# Patient Record
Sex: FEMALE | Race: WHITE | Employment: OTHER
[De-identification: names, ages, dates, MRNs, and addresses within clinical notes are randomized per-mention and may not be internally consistent; named-entity substitution may affect disease eponyms.]

---

## 2020-11-16 ENCOUNTER — HEALTH MAINTENANCE LETTER (OUTPATIENT)
Age: 40
End: 2020-11-16

## 2020-12-09 ENCOUNTER — APPOINTMENT (OUTPATIENT)
Dept: GENERAL RADIOLOGY | Facility: CLINIC | Age: 40
End: 2020-12-09
Attending: EMERGENCY MEDICINE
Payer: COMMERCIAL

## 2020-12-09 ENCOUNTER — HOSPITAL ENCOUNTER (EMERGENCY)
Facility: CLINIC | Age: 40
Discharge: HOME | End: 2020-12-09
Attending: EMERGENCY MEDICINE | Admitting: EMERGENCY MEDICINE
Payer: COMMERCIAL

## 2020-12-09 VITALS
OXYGEN SATURATION: 100 % | WEIGHT: 134 LBS | BODY MASS INDEX: 21.03 KG/M2 | TEMPERATURE: 99.6 F | HEIGHT: 67 IN | DIASTOLIC BLOOD PRESSURE: 55 MMHG | RESPIRATION RATE: 15 BRPM | HEART RATE: 88 BPM | SYSTOLIC BLOOD PRESSURE: 92 MMHG

## 2020-12-09 DIAGNOSIS — Z92.21 STATUS POST CHEMOTHERAPY: ICD-10-CM

## 2020-12-09 DIAGNOSIS — Z20.822 SUSPECTED COVID-19 VIRUS INFECTION: ICD-10-CM

## 2020-12-09 DIAGNOSIS — R50.9 FEVER OF UNKNOWN ORIGIN: ICD-10-CM

## 2020-12-09 LAB
ALBUMIN SERPL-MCNC: 3.6 G/DL (ref 3.4–5)
ALBUMIN UR-MCNC: NEGATIVE MG/DL
ALP SERPL-CCNC: 68 U/L (ref 40–150)
ALT SERPL W P-5'-P-CCNC: 59 U/L (ref 0–50)
ANION GAP SERPL CALCULATED.3IONS-SCNC: 4 MMOL/L (ref 3–14)
APPEARANCE UR: CLEAR
AST SERPL W P-5'-P-CCNC: 20 U/L (ref 0–45)
BASOPHILS # BLD AUTO: 0 10E9/L (ref 0–0.2)
BASOPHILS NFR BLD AUTO: 0.5 %
BILIRUB SERPL-MCNC: 0.4 MG/DL (ref 0.2–1.3)
BILIRUB UR QL STRIP: NEGATIVE
BUN SERPL-MCNC: 10 MG/DL (ref 7–30)
CALCIUM SERPL-MCNC: 8.4 MG/DL (ref 8.5–10.1)
CHLORIDE SERPL-SCNC: 104 MMOL/L (ref 94–109)
CO2 SERPL-SCNC: 28 MMOL/L (ref 20–32)
COLOR UR AUTO: ABNORMAL
CREAT SERPL-MCNC: 0.65 MG/DL (ref 0.52–1.04)
CRP SERPL-MCNC: 3.1 MG/L (ref 0–8)
DIFFERENTIAL METHOD BLD: ABNORMAL
EOSINOPHIL # BLD AUTO: 0 10E9/L (ref 0–0.7)
EOSINOPHIL NFR BLD AUTO: 0.5 %
ERYTHROCYTE [DISTWIDTH] IN BLOOD BY AUTOMATED COUNT: 15.2 % (ref 10–15)
GFR SERPL CREATININE-BSD FRML MDRD: >90 ML/MIN/{1.73_M2}
GLUCOSE SERPL-MCNC: 88 MG/DL (ref 70–99)
GLUCOSE UR STRIP-MCNC: NEGATIVE MG/DL
HCT VFR BLD AUTO: 31.2 % (ref 35–47)
HGB BLD-MCNC: 10.4 G/DL (ref 11.7–15.7)
HGB UR QL STRIP: NEGATIVE
IMM GRANULOCYTES # BLD: 0 10E9/L (ref 0–0.4)
IMM GRANULOCYTES NFR BLD: 0.9 %
KETONES UR STRIP-MCNC: NEGATIVE MG/DL
LACTATE BLD-SCNC: 0.8 MMOL/L (ref 0.7–2)
LEUKOCYTE ESTERASE UR QL STRIP: NEGATIVE
LYMPHOCYTES # BLD AUTO: 0.3 10E9/L (ref 0.8–5.3)
LYMPHOCYTES NFR BLD AUTO: 6.1 %
MCH RBC QN AUTO: 30.4 PG (ref 26.5–33)
MCHC RBC AUTO-ENTMCNC: 33.3 G/DL (ref 31.5–36.5)
MCV RBC AUTO: 91 FL (ref 78–100)
MONOCYTES # BLD AUTO: 0.4 10E9/L (ref 0–1.3)
MONOCYTES NFR BLD AUTO: 9.9 %
MUCOUS THREADS #/AREA URNS LPF: PRESENT /LPF
NEUTROPHILS # BLD AUTO: 3.6 10E9/L (ref 1.6–8.3)
NEUTROPHILS NFR BLD AUTO: 82.1 %
NITRATE UR QL: NEGATIVE
NRBC # BLD AUTO: 0 10*3/UL
NRBC BLD AUTO-RTO: 0 /100
PH UR STRIP: 7.5 PH (ref 5–7)
PLATELET # BLD AUTO: 169 10E9/L (ref 150–450)
POTASSIUM SERPL-SCNC: 3.7 MMOL/L (ref 3.4–5.3)
PROT SERPL-MCNC: 6.7 G/DL (ref 6.8–8.8)
RBC # BLD AUTO: 3.42 10E12/L (ref 3.8–5.2)
RBC #/AREA URNS AUTO: 1 /HPF (ref 0–2)
SARS-COV-2 RNA SPEC QL NAA+PROBE: NORMAL
SODIUM SERPL-SCNC: 136 MMOL/L (ref 133–144)
SOURCE: ABNORMAL
SP GR UR STRIP: 1.01 (ref 1–1.03)
SPECIMEN SOURCE: NORMAL
UROBILINOGEN UR STRIP-MCNC: NORMAL MG/DL (ref 0–2)
WBC # BLD AUTO: 4.4 10E9/L (ref 4–11)
WBC #/AREA URNS AUTO: 1 /HPF (ref 0–5)

## 2020-12-09 PROCEDURE — 87040 BLOOD CULTURE FOR BACTERIA: CPT | Performed by: EMERGENCY MEDICINE

## 2020-12-09 PROCEDURE — 86140 C-REACTIVE PROTEIN: CPT | Performed by: EMERGENCY MEDICINE

## 2020-12-09 PROCEDURE — 81001 URINALYSIS AUTO W/SCOPE: CPT | Performed by: EMERGENCY MEDICINE

## 2020-12-09 PROCEDURE — 85025 COMPLETE CBC W/AUTO DIFF WBC: CPT | Performed by: EMERGENCY MEDICINE

## 2020-12-09 PROCEDURE — 87086 URINE CULTURE/COLONY COUNT: CPT | Performed by: EMERGENCY MEDICINE

## 2020-12-09 PROCEDURE — C9803 HOPD COVID-19 SPEC COLLECT: HCPCS

## 2020-12-09 PROCEDURE — U0003 INFECTIOUS AGENT DETECTION BY NUCLEIC ACID (DNA OR RNA); SEVERE ACUTE RESPIRATORY SYNDROME CORONAVIRUS 2 (SARS-COV-2) (CORONAVIRUS DISEASE [COVID-19]), AMPLIFIED PROBE TECHNIQUE, MAKING USE OF HIGH THROUGHPUT TECHNOLOGIES AS DESCRIBED BY CMS-2020-01-R: HCPCS | Performed by: EMERGENCY MEDICINE

## 2020-12-09 PROCEDURE — 250N000013 HC RX MED GY IP 250 OP 250 PS 637: Performed by: EMERGENCY MEDICINE

## 2020-12-09 PROCEDURE — 258N000003 HC RX IP 258 OP 636: Performed by: EMERGENCY MEDICINE

## 2020-12-09 PROCEDURE — 250N000011 HC RX IP 250 OP 636: Performed by: EMERGENCY MEDICINE

## 2020-12-09 PROCEDURE — 83605 ASSAY OF LACTIC ACID: CPT | Performed by: EMERGENCY MEDICINE

## 2020-12-09 PROCEDURE — 99284 EMERGENCY DEPT VISIT MOD MDM: CPT | Mod: 25

## 2020-12-09 PROCEDURE — 71045 X-RAY EXAM CHEST 1 VIEW: CPT

## 2020-12-09 PROCEDURE — 96365 THER/PROPH/DIAG IV INF INIT: CPT

## 2020-12-09 PROCEDURE — 96361 HYDRATE IV INFUSION ADD-ON: CPT

## 2020-12-09 PROCEDURE — 80053 COMPREHEN METABOLIC PANEL: CPT | Performed by: EMERGENCY MEDICINE

## 2020-12-09 RX ORDER — LEVOFLOXACIN 5 MG/ML
500 INJECTION, SOLUTION INTRAVENOUS ONCE
Status: COMPLETED | OUTPATIENT
Start: 2020-12-09 | End: 2020-12-09

## 2020-12-09 RX ORDER — HEPARIN SODIUM (PORCINE) LOCK FLUSH IV SOLN 100 UNIT/ML 100 UNIT/ML
5 SOLUTION INTRAVENOUS ONCE
Status: COMPLETED | OUTPATIENT
Start: 2020-12-09 | End: 2020-12-09

## 2020-12-09 RX ORDER — ACETAMINOPHEN 325 MG/1
650 TABLET ORAL ONCE
Status: COMPLETED | OUTPATIENT
Start: 2020-12-09 | End: 2020-12-09

## 2020-12-09 RX ORDER — LEVOFLOXACIN 500 MG/1
500 TABLET, FILM COATED ORAL DAILY
Qty: 6 TABLET | Refills: 0 | Status: SHIPPED | OUTPATIENT
Start: 2020-12-09 | End: 2020-12-15

## 2020-12-09 RX ADMIN — SODIUM CHLORIDE 1000 ML: 9 INJECTION, SOLUTION INTRAVENOUS at 11:47

## 2020-12-09 RX ADMIN — ACETAMINOPHEN 650 MG: 325 TABLET, FILM COATED ORAL at 11:11

## 2020-12-09 RX ADMIN — LEVOFLOXACIN 500 MG: 5 INJECTION, SOLUTION INTRAVENOUS at 13:57

## 2020-12-09 RX ADMIN — SODIUM CHLORIDE 1000 ML: 9 INJECTION, SOLUTION INTRAVENOUS at 12:57

## 2020-12-09 RX ADMIN — HEPARIN SODIUM (PORCINE) LOCK FLUSH IV SOLN 100 UNIT/ML 5 ML: 100 SOLUTION at 16:07

## 2020-12-09 ASSESSMENT — ENCOUNTER SYMPTOMS
FREQUENCY: 0
DIARRHEA: 0
NAUSEA: 0
FEVER: 1
SHORTNESS OF BREATH: 0
SORE THROAT: 0
MYALGIAS: 1
CHILLS: 1
BACK PAIN: 0
RHINORRHEA: 0
DYSURIA: 0
VOMITING: 0
COUGH: 0
HEMATURIA: 0
FATIGUE: 1
HEADACHES: 1

## 2020-12-09 ASSESSMENT — MIFFLIN-ST. JEOR: SCORE: 1320.45

## 2020-12-09 NOTE — RESULT ENCOUNTER NOTE
Emergency Dept/Urgent Care discharge antibiotic (if prescribed): Levofloxacin (Levaquin) 500 mg PO tablet, 1 tablet (500 mg) by mouth once daily for 6 days.  Date of Rx (if applicable):  12/9/20  No changes in treatment per Urine culture protocol.

## 2020-12-09 NOTE — ED AVS SNAPSHOT
[facility] Emergency Dept  [address] [address]  Phone: [phone]  Fax: [phone]                                    Dyana Garcia   MRN: [id number]    Department: [facility] Emergency Dept   Date of Visit: 12/9/2020           After Visit Summary Signature Page    I have received my discharge instructions, and my questions have been answered. I have discussed any challenges I see with this plan with the nurse or doctor.    ..........................................................................................................................................  Patient/Patient Representative Signature      ..........................................................................................................................................  Patient Representative Print Name and Relationship to Patient    ..................................................               ................................................  Date                                   Time    ..........................................................................................................................................  Reviewed by Signature/Title    ...................................................              ..............................................  Date                                               Time          22EPIC Rev 08/18

## 2020-12-09 NOTE — ED PROVIDER NOTES
History   Chief Complaint:  Fever     The history is provided by the patient.      Dyana Garcia is a 40 year old female with history of right breast cancer currently undergoing chemotherapy who presents with fever. She awoke this morning with lower body myalgias, fatigue, and chills. She took her temperature and the highest this morning was 100.9 F. She denies respiratory symptoms such as rhinorrhea, cough, congestion, sore throat, or shortness of breath. No chest pain. She does have a headache, but believes it is because she is wearing a tight mask. She did have cloud urine a few days ago, but no associated urinary urgency, frequency, hematuria, or dysuria. No vomiting, nausea, diarrhea, or back pain. No loss of taste or smell and no known exposures to COVID.     She reports that she was diagnosed with right breast cancer a year ago in October and has since undergone a lumpectomy and is currently undergoing chemotherapy with Taxol and Carbol. She has had 7 rounds of weekly chemotherapy with her last round being yesterday. She also receives biweekly WBC shots to maintain her immune system, and had her WBC checked yesterday and was normal. She follows with Dr. Swenson of [facility].      Allergies:  No Known Allergies    Medications:   Docusate  Zofran  Ativan   Compazine   Medical Cannabis    Past Medical History:    Breast cancer to right breast     Past Surgical History:    [place] tooth extraction  LEEP procedure  Breast lumpectomy    IR Chest Port     Family History:    Thyroid disease   Hypertension   Coronary artery disease    No family history of breast cancer     Social History:  PCP: Dixie Kearney   She lives with her  two kids who are 7 and 9.   Never smoker    Review of Systems   Constitutional: Positive for chills, fatigue and fever.   HENT: Negative for congestion, rhinorrhea and sore throat.         No loss of taste or smell.    Respiratory: Negative for cough and shortness  "of breath.    Cardiovascular: Negative for chest pain.   Gastrointestinal: Negative for diarrhea, nausea and vomiting.   Genitourinary: Negative for dysuria, frequency, hematuria and urgency.        Cloudy urine   Musculoskeletal: Positive for myalgias. Negative for back pain.   Neurological: Positive for headaches.   All other systems reviewed and are negative.    Physical Exam     Patient Vitals for the past 24 hrs:   BP Temp Temp src Pulse Resp SpO2 Height Weight   12/09/20 1230 92/53 -- -- 96 -- 100 % -- --   12/09/20 1215 (!) 87/61 -- -- 93 -- 100 % -- --   12/09/20 1200 98/52 -- -- 93 -- -- -- --   12/09/20 1150 (!) 85/60 -- -- -- -- 100 % -- --   12/09/20 1131 (!) 88/50 99.6  F (37.6  C) Oral 95 15 99 % 1.702 m (5' 7\") 60.8 kg (134 lb)       Physical Exam  Nursing note and vitals reviewed.  Constitutional:  Appears non toxic and comfortable. No respiratory distress.   HENT:      Head:    Atraumatic. Alopecia.   Mouth/Throat:   Oropharynx is clear and moist. No oropharyngeal exudate.   Eyes:    Pupils are equal, round, and reactive to light.   Neck:    Normal range of motion. Neck supple.      No tracheal deviation present. No thyromegaly present.   Cardiovascular:  Normal rate, regular rhythm, no murmur   Pulmonary/Chest: Breath sounds are clear and equal without wheezes or crackles.     Port site and right chest wall appear normal with no surrounding swelling or tenderness.   Abdominal:   Soft. Bowel sounds are normal. Exhibits no distension and      no mass. There is no tenderness.      There is no rebound and no guarding.   Musculoskeletal:  Exhibits no edema.   Lymphadenopathy:  No cervical adenopathy.   Neurological:   Alert and oriented to person, place, and time.   Skin:    Skin is warm and dry. No rash noted. No pallor.     Emergency Department Course     Imaging:  Radiology findings were communicated with the patient who voiced understanding of the findings.    XR Chest Port 1 View  IMPRESSION: There " is a right internal jugular central port catheter  with the tip over the SVC. Lungs are clear. No consolidation or  effusion. Cardiac and mediastinal silhouettes and osseous structures  are within normal limits.  Reading per radiology     Laboratory:  Laboratory findings were communicated with the patient who voiced understanding of the findings.    UA with microscopic: pH 7.5(H), mucous present o/w WNL  Urine culture pending    Blood culture pending x2  CBC: WBC 4.4, HGB 10.4(L),   CMP: calcium 8.4(L), protein total 6.7(L), ALT 59(H) o/w WNL (Creatinine 0.65)  Lactic acid whole blood(result time 1208) 0.8  CRP inflammation: 3.1    Symptomatic COVID19 Virus PCR by nasopharyngeal swab pending      Interventions:  1111 Tylenol 650 mg Oral  1147 NS 1000 mL IV  1257 NS 1000 mL IV  1357 Levaquin 500 mg IV    Emergency Department Course:  1055 Nursing notes and vitals reviewed. Past medical history reviewed.     1100 I performed an exam of the patient as documented above.     1235 I requested we page [facility].     1240 I rechecked the patient. She states she is feeling improved. She notes her baseline systolic blood pressure is 90 to 100. Explained findings to the Patient.    1252 I spoke with Dr. Swenson of Oncology service from [facility] regarding the patient's presentation, findings, and plan of care. They feel the patient can be discharged to home.     1255 I returned to check on the patient and update her on findings.     Findings and plan explained to the Patient. Patient discharged home with instructions regarding supportive care, medications, and reasons to return. The importance of close follow-up was reviewed. The patient was prescribed Levaquin.     Impression & Plan    Covid-19  Dyana Garcia was evaluated during a global COVID-19 pandemic, which necessitated consideration that the patient might be at risk for infection with the SARS-CoV-2 virus that causes COVID-19.   Applicable  protocols for evaluation were followed during the patient's care.   COVID-19 was considered as part of the patient's evaluation. The plan for testing is:  a test was obtained during this visit.    Medical Decision Making:  Dyana Garcia is a 40 year old female who I found to have a fever and this could be a chemo induced fever as she had chemo yesterday. I did not find any source of bacteria infection; she appears non toxic here. Concern for possible COVID19 infection, so testing was sent. She had soft blood pressure here from 90's to upper 80's, but she was asymptomatic with this stating she normally has low blood pressures. There is no sign of sepsis here and no sign of pneumonia, UTI, bacterial skin infection, or other concern for bacterial infection. Blood cultures and urine cultures were sent. I consulted her oncologist who felt she could be safely discharged to home with close follow up and management. Her lab results were reviewed and she was told to concern for any concerning symptoms. She was placed on Levaquin for one week and was given a first dose in the ER. Instructed to continue tylenol as needed for fever. Since she is not neutropenic and has a normal WBC, we felt she could be discharged. I discussed the risks of Levaquin with her including tendon rupture or neurologic problems. She understands and agrees.     Diagnosis:    ICD-10-CM    1. Fever of unknown origin  R50.9    2. Suspected COVID-19 virus infection  Z20.828    3. Status post chemotherapy  Z92.21        Disposition:   discharged to home    Discharge Medications:  New Prescriptions    LEVOFLOXACIN (LEVAQUIN) 500 MG TABLET    Take 1 tablet (500 mg) by mouth daily for 6 days       Scribe Disclosure:  Michaela CHRISTINE, am serving as a scribe at 10:56 AM on 12/9/2020 to document services personally performed by Katerina Lieberman MD based on my observations and the provider's statements to me.   [facility] EMERGENCY DEPARTMENT        Katerina Lieberman MD  12/09/20 1542       Katerina Lieberman MD  12/09/20 1543

## 2020-12-10 LAB
BACTERIA SPEC CULT: NORMAL
LABORATORY COMMENT REPORT: NORMAL
Lab: NORMAL
SARS-COV-2 RNA SPEC QL NAA+PROBE: NEGATIVE
SPECIMEN SOURCE: NORMAL
SPECIMEN SOURCE: NORMAL

## 2020-12-11 NOTE — RESULT ENCOUNTER NOTE
Final urine culture report is NEGATIVE per [facility] Lab Result protocol.    If NEGATIVE result, no change in treatment, per [facility] Lab Result protocol.

## 2020-12-15 LAB
BACTERIA SPEC CULT: NO GROWTH
BACTERIA SPEC CULT: NO GROWTH
Lab: NORMAL
Lab: NORMAL
SPECIMEN SOURCE: NORMAL
SPECIMEN SOURCE: NORMAL

## 2021-08-31 ENCOUNTER — APPOINTMENT (OUTPATIENT)
Dept: URBAN - METROPOLITAN AREA CLINIC 259 | Age: 41
Setting detail: DERMATOLOGY
End: 2021-09-29

## 2021-08-31 DIAGNOSIS — D22 MELANOCYTIC NEVI: ICD-10-CM

## 2021-08-31 DIAGNOSIS — L57.8 OTHER SKIN CHANGES DUE TO CHRONIC EXPOSURE TO NONIONIZING RADIATION: ICD-10-CM

## 2021-08-31 DIAGNOSIS — L81.4 OTHER MELANIN HYPERPIGMENTATION: ICD-10-CM

## 2021-08-31 DIAGNOSIS — L82.1 OTHER SEBORRHEIC KERATOSIS: ICD-10-CM

## 2021-08-31 DIAGNOSIS — Z71.89 OTHER SPECIFIED COUNSELING: ICD-10-CM

## 2021-08-31 DIAGNOSIS — D18.0 HEMANGIOMA: ICD-10-CM

## 2021-08-31 PROBLEM — D22.5 MELANOCYTIC NEVI OF TRUNK: Status: ACTIVE | Noted: 2021-08-31

## 2021-08-31 PROBLEM — D18.01 HEMANGIOMA OF SKIN AND SUBCUTANEOUS TISSUE: Status: ACTIVE | Noted: 2021-08-31

## 2021-08-31 PROCEDURE — OTHER COUNSELING: OTHER

## 2021-08-31 PROCEDURE — OTHER MIPS QUALITY: OTHER

## 2021-08-31 PROCEDURE — 99203 OFFICE O/P NEW LOW 30 MIN: CPT

## 2021-08-31 ASSESSMENT — LOCATION SIMPLE DESCRIPTION DERM
LOCATION SIMPLE: LEFT UPPER BACK
LOCATION SIMPLE: UPPER BACK
LOCATION SIMPLE: LEFT CHEEK
LOCATION SIMPLE: LEFT ANTERIOR NECK

## 2021-08-31 ASSESSMENT — LOCATION DETAILED DESCRIPTION DERM
LOCATION DETAILED: INFERIOR THORACIC SPINE
LOCATION DETAILED: LEFT MEDIAL UPPER BACK
LOCATION DETAILED: LEFT CENTRAL MALAR CHEEK
LOCATION DETAILED: LEFT INFERIOR MEDIAL UPPER BACK
LOCATION DETAILED: LEFT SUPERIOR ANTERIOR NECK

## 2021-08-31 ASSESSMENT — LOCATION ZONE DERM
LOCATION ZONE: FACE
LOCATION ZONE: NECK
LOCATION ZONE: TRUNK

## 2021-08-31 NOTE — PROCEDURE: MIPS QUALITY
Quality 110: Preventive Care And Screening: Influenza Immunization: Influenza Immunization Ordered or Recommended, but not Administered due to system reason
Quality 130: Documentation Of Current Medications In The Medical Record: Current Medications Documented
Detail Level: Generalized
Quality 431: Preventive Care And Screening: Unhealthy Alcohol Use - Screening: Patient not identified as an unhealthy alcohol user when screened for unhealthy alcohol use using a systematic screening method
Quality 226: Preventive Care And Screening: Tobacco Use: Screening And Cessation Intervention: Patient screened for tobacco use and is an ex/non-smoker

## 2021-09-18 ENCOUNTER — HEALTH MAINTENANCE LETTER (OUTPATIENT)
Age: 41
End: 2021-09-18

## 2021-12-15 ENCOUNTER — TRANSCRIBE ORDERS (OUTPATIENT)
Dept: OTHER | Age: 41
End: 2021-12-15

## 2021-12-15 ENCOUNTER — DOCUMENTATION ONLY (OUTPATIENT)
Dept: ONCOLOGY | Facility: CLINIC | Age: 41
End: 2021-12-15
Payer: COMMERCIAL

## 2021-12-15 DIAGNOSIS — C50.919 BREAST CANCER (H): Primary | ICD-10-CM

## 2021-12-16 ENCOUNTER — PATIENT OUTREACH (OUTPATIENT)
Dept: ONCOLOGY | Facility: CLINIC | Age: 41
End: 2021-12-16
Payer: COMMERCIAL

## 2021-12-16 NOTE — PROGRESS NOTES
Action December 15, 2021 7:28 PM ABT   Action Taken Image request was sent to [facility] and [facility]. Will call patient to get [facility] for [facility].     Action December 22, 2021 12:18 PM ABT   Action Taken Called and unable to speak to patient. LVM for patient to call back in regards to XIOMARA

## 2021-12-16 NOTE — PROGRESS NOTES
New Patient Oncology Nurse Navigator Note     Referring provider: Self     Referring Clinic/Organization: Transferring care from [facility] -- former patient of Dr. Sheron Swenson     Referred to (specialty: Medical Oncology     Requested provider (if applicable): Dr. Vianca Moses     Date Referral Received: December 16, 2021     Evaluation for:  Breast cancer    Clinical History (per Nurse review of records provided):     Dyana Garcia is a 41 y.o. female with a history of right breast cancer diagnosed in October 2020.  Patient self-detected a right breast mass. On 5/6/2020 diagnostic imaging showed a 1.5 x 1.3 x 2.1 cm mass at 11:00, 2 cm from the nipple of the right breast. Short-term follow up was recommended.    Mass increased in size between May and September of 2020 9/14/2020 - Consult with Dr. Yumiko Benjamin to discuss excisional biopsy.    10/5/2020 - Dr. Yumiko Benjamin performed:   A) RIGHT BREAST, LUMPECTOMY (excisional biopsy):   1. Metaplastic carcinoma (see comment)       a. Size: 4.8 cm       b. Vincent grade III of III       c. Lymphovascular invasion: Present       d. Margins: Positive       e. Ductal carcinoma in situ (DCIS): Absent   2. Breast Ancillary Testing:         a. Hormone Receptors:             Estrogen receptor: Negative             Progesterone receptor: Negative       b. HER2 by IHC: Negative (0 by manual morphometry)   3. Surrounding benign breast tissue present    Excision of the mass (due to interval increase on imaging) revealed invasive metaplastic carcinoma, grade 3, with angiolymphatic invasion and positive margins, ER negative, UT negative, HER-2 negative.     10/9/2020    Breast MRI showed post-operative changes with nonmass enhancement surrounding the area of excision and the nipple areolar complex. Several level 1 axillary lymph nodes showed cortical thickening.  LEFT breast showed a 2.1 x 2.0 x 1.8 cm mass in the upper central breast.     WHEN DID SHE FIRST MEET  WITH DR. CRUZ? (AT LEAST BY 10/9 SINCE ORDER TO ECHO EXISTS) October 2020    WHEN DID SHE FIRST GO TO [place] AND WHAT DID SHE EVENTUALLY HAVE DONE THERE?    10/14/21 Digital Mammogram and diagnostic bilateral Ultrasound - [place]     10/14/21 - Met with Mendy Wick CGC.  Patient chose to proceed with testing via [employer] Breast and Gynecologic Cancers Guidelines-Based Panel + Pancreatic Cancer Panel (28 genes).   RESULTS OF GENETIC TESTING    10/2020 - Chemotherapy   Patient initiated chemotherapy (3rd cycle of Taxol/carboplatin given on 10/27/2020).    Planned 12 cycles of Taxol/Carboplatin followed by 4 cycles of adriamycin/cytoxan.  [THEY DID TAXOL/CARBO FIRST AND THEN AC]    11/9/2020 Biopsy/Pathology   Biopsy of upper central left breast was negative for malignancy.  Right axillary FNA was nondiagnostic with insufficient lymph node sampling.    11/12/2020 - Met with Samuel Hunt of [facility] and post-mastectomy radiation therapy was anticipated.    11/12/2020 Other [ASSUME [place] NOTE?]  Patient was seen at [place] by Dr. Del Rio with Dr. Oneill of Breast Surgery and Dr. Hunt of Radiation /Oncology. In reviewing her case, they noted this was initially suspected as a fibroadenoma, and she went for excisional biopsy as initial definitive management. The pathology was reviewed at [place], and found to be consistent with metaplastic subtype. A [place] the margins are only noted as focally positive. The tumor measured 4.8 cm. Lymphovascular invasion was identified. There was no sentinel lymph node done at the time of the original biopsy. Her postop MRI notes some abnormal lymph nodes, and we are suspicious here, but challenging to interpret in light of the MRI being postoperative, and the attempted FNA being nondiagnostic.     We recommended she complete chemotherapy and move forward with additional locoregional management, with tentative plan for bilateral mastectomy and sentinel node biopsy.    1/18/21 -  Consult with Ricki Rosas M.D, plastic surgeon at [facility]  plan on implant-based reconstruction with placement of tissue expanders during the first stage and expanding her prior to the start of radiation.    4/15/2021 Surgery and Procedures   She underwent bilateral nipple sparing mastectomy with right sentinel node biopsy and expander reconstruction.  A.  Breast, right, duct excision:  Negative for tumor. Nipple ducts present.  B.  Breast, left, duct excision:  Negative for tumor. Nipple ducts present.  C.  Breast, left, nipple-sparing mastectomy:  Benign breast parenchyma.  D.  Breast, right, nipple-sparing mastectomy:  No residual lesion identified.  Prior surgical site changes identified.  E.  Lymph node, right axillary No. 1, sentinel biopsy:  A single (1) lymph node is negative for metastatic carcinoma.  F.  Lymph node, right axillary No. 2, sentinel biopsy:  A single (1) lymph node is negative for metastatic carcinoma.  G.  Lymph node, right axillary No. 3, sentinel biopsy:  A single (1) lymph node is negative for metastatic carcinoma.  Her final pathology showed no residual carcinoma that was identified in the breast. All 3 sentinel lymph nodes returned negative.    WHERE DID SHE RECEIVE RADIATION THERAPY? ([place]?)  WHEN DID SHE SEE DR. CRUZ LAST AND WHEN IS SHE DUE FOR FOLLOW UP?   ANY INFUSIONS OR INJECTIONS NEEDED?  HAVE THERE BEEN ANY CLINICAL DEVELOPMENTS SINCE SHE LAST SAW DR. CRUZ    Records Location: Care Everywhere, Media and See Bookmarked material     Records Needed: [facility] notes  Breast Imaging since 2019    Telephoned and left voice message for Dyana requesting call back to review questions outlined above.

## 2021-12-17 NOTE — PROGRESS NOTES
Telephoned and spoke with Dyana.  Since she called with her self referral she is reconsidered just staying with [facility] system.  Invited patient to call writer when she has made her decision one way or the other.  We are happy to assist in scheduling if she wishes.

## 2022-01-05 NOTE — PROGRESS NOTES
Writer placed call to Dyana to assess if she is still interested in scheduling with Dr Vianca Moses for a transfer of care for breast cancer. No answer received, will retry on Friday.

## 2022-01-07 NOTE — PROGRESS NOTES
Telephoned and spoke with Dyana.  She is going to continue her care at [facility].  Welcomed calls or self referral if she would want to pursue care through [facility] in the future. Susu Dillon RN

## 2022-01-08 ENCOUNTER — HEALTH MAINTENANCE LETTER (OUTPATIENT)
Age: 42
End: 2022-01-08

## 2022-11-20 ENCOUNTER — HEALTH MAINTENANCE LETTER (OUTPATIENT)
Age: 42
End: 2022-11-20

## 2023-04-15 ENCOUNTER — HEALTH MAINTENANCE LETTER (OUTPATIENT)
Age: 43
End: 2023-04-15

## 2024-04-07 ENCOUNTER — HEALTH MAINTENANCE LETTER (OUTPATIENT)
Age: 44
End: 2024-04-07

## 2024-06-16 ENCOUNTER — HEALTH MAINTENANCE LETTER (OUTPATIENT)
Age: 44
End: 2024-06-16